# Patient Record
Sex: FEMALE | Race: WHITE | Employment: FULL TIME
[De-identification: names, ages, dates, MRNs, and addresses within clinical notes are randomized per-mention and may not be internally consistent; named-entity substitution may affect disease eponyms.]

---

## 2022-05-24 ENCOUNTER — TELEMEDICINE (OUTPATIENT)
Dept: INTERNAL MEDICINE CLINIC | Facility: CLINIC | Age: 32
End: 2022-05-24
Payer: COMMERCIAL

## 2022-05-24 ENCOUNTER — E-VISIT (OUTPATIENT)
Dept: INTERNAL MEDICINE CLINIC | Facility: CLINIC | Age: 32
End: 2022-05-24
Payer: COMMERCIAL

## 2022-05-24 DIAGNOSIS — L02.92 CARBUNCLE AND FURUNCLE: Primary | ICD-10-CM

## 2022-05-24 DIAGNOSIS — L02.93 CARBUNCLE AND FURUNCLE: Primary | ICD-10-CM

## 2022-05-24 PROCEDURE — 99214 OFFICE O/P EST MOD 30 MIN: CPT | Performed by: INTERNAL MEDICINE

## 2022-05-24 RX ORDER — SULFAMETHOXAZOLE AND TRIMETHOPRIM 800; 160 MG/1; MG/1
1 TABLET ORAL 2 TIMES DAILY
Qty: 20 TABLET | Refills: 0 | Status: SHIPPED | OUTPATIENT
Start: 2022-05-24 | End: 2022-06-03

## 2022-05-24 RX ORDER — TRIAMCINOLONE ACETONIDE 5 MG/G
CREAM TOPICAL
Qty: 1 EACH | Refills: 0 | Status: SHIPPED | OUTPATIENT
Start: 2022-05-24 | End: 2022-05-31

## 2022-05-24 ASSESSMENT — ENCOUNTER SYMPTOMS
DIARRHEA: 0
NAIL CHANGES: 0
VOMITING: 0
SHORTNESS OF BREATH: 0

## 2022-05-24 NOTE — PROGRESS NOTES
FOLLOW UP VISIT    Subjective:    Merle Roots (: 1990) is a 28 y.o., female,   Chief Complaint   Patient presents with    Rash     under right arm  10 days        HPI:  28year old in for rash . She noticed it under her armpit a week ago. Has some raised lesion . Of note she does shave. Rash  This is a new problem. The current episode started in the past 7 days. The problem has been gradually worsening since onset. The affected locations include the left axilla. The rash is characterized by blistering, dryness, itchiness and redness. Pertinent negatives include no anorexia, diarrhea, facial edema, fever, joint pain, nail changes, shortness of breath or vomiting. The following portions of the patient's history were reviewed and updated as appropriate:      Past Medical History:   Diagnosis Date    PCOS (polycystic ovarian syndrome)        Past Surgical History:   Procedure Laterality Date    GYN  2018            Family History   Problem Relation Age of Onset    Stroke Paternal Grandfather     Diabetes Paternal Grandfather        Social History     Socioeconomic History    Marital status:      Spouse name: Not on file    Number of children: Not on file    Years of education: Not on file    Highest education level: Not on file   Occupational History    Not on file   Tobacco Use    Smoking status: Never Smoker    Smokeless tobacco: Never Used   Substance and Sexual Activity    Alcohol use: No    Drug use: Yes     Types: Marijuana Leah Ing)    Sexual activity: Not on file   Other Topics Concern    Not on file   Social History Narrative    Not on file     Social Determinants of Health     Financial Resource Strain:     Difficulty of Paying Living Expenses: Not on file   Food Insecurity:     Worried About [address] in the Last Year: Not on file    [address] in the Last Year: Not on file   Transportation Needs:     Lack of Transportation (Medical):  Not on file    Lack of Transportation (Non-Medical): Not on file   Physical Activity:     Days of Exercise per Week: Not on file    Minutes of Exercise per Session: Not on file   Stress:     Feeling of Stress : Not on file   Social Connections:     Frequency of Communication with Friends and Family: Not on file    Frequency of Social Gatherings with Friends and Family: Not on file    Attends Religion Services: Not on file    Active Member of [address] [place] [address] or Organizations: Not on file    Attends Club or Organization Meetings: Not on file    Marital Status: Not on file   Intimate Partner Violence:     Fear of Current or Ex-Partner: Not on file    Emotionally Abused: Not on file    Physically Abused: Not on file    Sexually Abused: Not on file   Housing Stability:     Unable to Pay for Housing in the Last Year: Not on file    Number of [place] in the Last Year: Not on file    Unstable Housing in the Last Year: Not on file       Current Outpatient Medications   Medication Sig Dispense Refill    sulfamethoxazole-trimethoprim (BACTRIM DS;SEPTRA DS) 800-160 MG per tablet Take 1 tablet by mouth 2 times daily for 10 days 20 tablet 0    triamcinolone (ARISTOCORT) 0.5 % cream Apply topically 3 times daily. 1 each 0    FOLIC ACID PO Take by mouth daily      MAGNESIUM PO Take by mouth daily      ergocalciferol (ERGOCALCIFEROL) 1.25 MG (99960 UT) capsule Take 50,000 Units by mouth every 7 days      hydrocortisone 2.5 % cream Place rectally 4 times daily (Patient not taking: Reported on 5/24/2022)      hydrocortisone (ANUSOL-HC) 25 MG suppository Place 25 mg rectally every 12 hours      medroxyPROGESTERone (PROVERA) 10 MG tablet Take 10 mg by mouth daily      metFORMIN (GLUCOPHAGE) 500 MG tablet Take 500 mg by mouth 3 times daily (with meals)       No current facility-administered medications for this visit.        Allergies as of 05/24/2022    (No Known Allergies)       Review of Systems   Constitutional: Negative for fever. Respiratory: Negative for shortness of breath. Gastrointestinal: Negative for anorexia, diarrhea and vomiting. Musculoskeletal: Negative for joint pain. Skin: Positive for rash. Negative for nail changes. Objective: There were no vitals taken for this visit. Physical Exam  Vitals and nursing note reviewed. Constitutional:       Appearance: Normal appearance. Skin:     Comments: Rash under left axilla 2-3 carbuncles appreciated - red raised lesion   Neurological:      Mental Status: She is alert. No results found for this visit on 05/24/22. Assessent & Plan     Diagnosis Orders   1. Carbuncle and furuncle  sulfamethoxazole-trimethoprim (BACTRIM DS;SEPTRA DS) 800-160 MG per tablet    triamcinolone (ARISTOCORT) 0.5 % cream        Very nice patient in for follow up. She has developed a rash on her armpit     Suspect carbuncle   Stop shaving don't use any creams or meds other then one rx. If not improved go to Dermatology  Patient is not pregnant or thinking about getting pregnant  If not improved rtc   Med risks and AE advised       Shahab Schmid  was evaluated through a synchronous (real-time) audio-video encounter, and/or her healthcare decision maker, is aware that it is a billable service, which includes applicable co-pays, with coverage as determined by her insurance carrier. She provided verbal consent to proceed and patient identification was verified. This visit was conducted pursuant to the emergency declaration under the [facility], [address] [place] [address] authority and the [employer] General Act. A caregiver was present when appropriate. Ability to conduct physical exam was limited. The patient was located at home in a state where the provider was licensed to provide care.      The patient and/or patient representative voiced understanding and agreement with the current diagnoses, recommendations, and possible side effects. No follow-up provider specified.       Jaguar Cheung MD

## 2023-02-02 ENCOUNTER — HOSPITAL ENCOUNTER (EMERGENCY)
Age: 33
Discharge: HOME | End: 2023-02-02
Attending: EMERGENCY MEDICINE
Payer: COMMERCIAL

## 2023-02-02 ENCOUNTER — APPOINTMENT (OUTPATIENT)
Dept: ULTRASOUND IMAGING | Age: 33
End: 2023-02-02
Payer: COMMERCIAL

## 2023-02-02 VITALS
SYSTOLIC BLOOD PRESSURE: 144 MMHG | HEART RATE: 80 BPM | TEMPERATURE: 98.7 F | DIASTOLIC BLOOD PRESSURE: 90 MMHG | BODY MASS INDEX: 43.19 KG/M2 | OXYGEN SATURATION: 99 % | HEIGHT: 68 IN | RESPIRATION RATE: 18 BRPM | WEIGHT: 285 LBS

## 2023-02-02 DIAGNOSIS — R10.11 ABDOMINAL PAIN, RIGHT UPPER QUADRANT: Primary | ICD-10-CM

## 2023-02-02 LAB
ALBUMIN SERPL-MCNC: 4.1 G/DL (ref 3.5–5)
ALBUMIN/GLOB SERPL: 1.3 (ref 0.4–1.6)
ALP SERPL-CCNC: 71 U/L (ref 45–117)
ALT SERPL-CCNC: 31 U/L (ref 13–61)
ANION GAP SERPL CALC-SCNC: 9 MMOL/L (ref 2–11)
APPEARANCE UR: CLEAR
AST SERPL-CCNC: 27 U/L (ref 15–37)
BASOPHILS # BLD: 0.1 K/UL (ref 0–0.2)
BASOPHILS NFR BLD: 1 % (ref 0–2)
BILIRUB SERPL-MCNC: 0.2 MG/DL (ref 0.2–1.1)
BILIRUB UR QL: NEGATIVE
BUN SERPL-MCNC: 12 MG/DL (ref 7–18)
CALCIUM SERPL-MCNC: 9.4 MG/DL (ref 8.3–10.4)
CHLORIDE SERPL-SCNC: 106 MMOL/L (ref 98–107)
CO2 SERPL-SCNC: 25 MMOL/L (ref 21–32)
COLOR UR: YELLOW
CREAT SERPL-MCNC: 0.43 MG/DL (ref 0.6–1)
DIFFERENTIAL METHOD BLD: ABNORMAL
EOSINOPHIL # BLD: 0.2 K/UL (ref 0–0.8)
EOSINOPHIL NFR BLD: 3 % (ref 0.5–7.8)
ERYTHROCYTE [DISTWIDTH] IN BLOOD BY AUTOMATED COUNT: 12.3 % (ref 11.9–14.6)
GLOBULIN SER CALC-MCNC: 3.2 G/DL (ref 2.8–4.5)
GLUCOSE SERPL-MCNC: 99 MG/DL (ref 65–100)
GLUCOSE UR STRIP.AUTO-MCNC: NEGATIVE MG/DL
HCG UR QL: NEGATIVE
HCT VFR BLD AUTO: 41 % (ref 35.8–46.3)
HGB BLD-MCNC: 14.1 G/DL (ref 11.7–15.4)
HGB UR QL STRIP: NEGATIVE
IMM GRANULOCYTES # BLD AUTO: 0 K/UL (ref 0–0.5)
IMM GRANULOCYTES NFR BLD AUTO: 0 % (ref 0–5)
KETONES UR QL STRIP.AUTO: NEGATIVE MG/DL
LEUKOCYTE ESTERASE UR QL STRIP.AUTO: NEGATIVE
LIPASE SERPL-CCNC: 23 U/L (ref 13–60)
LYMPHOCYTES # BLD: 2 K/UL (ref 0.5–4.6)
LYMPHOCYTES NFR BLD: 29 % (ref 13–44)
MCH RBC QN AUTO: 29.8 PG (ref 26.1–32.9)
MCHC RBC AUTO-ENTMCNC: 34.4 G/DL (ref 31.4–35)
MCV RBC AUTO: 86.7 FL (ref 82–102)
MONOCYTES # BLD: 0.5 K/UL (ref 0.1–1.3)
MONOCYTES NFR BLD: 7 % (ref 4–12)
NEUTS SEG # BLD: 4.2 K/UL (ref 1.7–8.2)
NEUTS SEG NFR BLD: 60 % (ref 43–78)
NITRITE UR QL STRIP.AUTO: NEGATIVE
NRBC # BLD: 0 K/UL (ref 0–0.2)
PH UR STRIP: 7 (ref 5–9)
PLATELET # BLD AUTO: 218 K/UL (ref 150–450)
PMV BLD AUTO: 8.5 FL (ref 9.4–12.3)
POTASSIUM SERPL-SCNC: 3.9 MMOL/L (ref 3.5–5.1)
PROT SERPL-MCNC: 7.3 G/DL (ref 6.4–8.2)
PROT UR STRIP-MCNC: NEGATIVE MG/DL
RBC # BLD AUTO: 4.73 M/UL (ref 4.05–5.2)
SODIUM SERPL-SCNC: 140 MMOL/L (ref 133–143)
SP GR UR REFRACTOMETRY: 1.01 (ref 1–1.02)
UROBILINOGEN UR QL STRIP.AUTO: 0.2 EU/DL (ref 0.2–1)
WBC # BLD AUTO: 7 K/UL (ref 4.3–11.1)

## 2023-02-02 PROCEDURE — 85025 COMPLETE CBC W/AUTO DIFF WBC: CPT

## 2023-02-02 PROCEDURE — 6370000000 HC RX 637 (ALT 250 FOR IP): Performed by: PHYSICIAN ASSISTANT

## 2023-02-02 PROCEDURE — 83690 ASSAY OF LIPASE: CPT

## 2023-02-02 PROCEDURE — 81025 URINE PREGNANCY TEST: CPT

## 2023-02-02 PROCEDURE — 80053 COMPREHEN METABOLIC PANEL: CPT

## 2023-02-02 PROCEDURE — 76705 ECHO EXAM OF ABDOMEN: CPT

## 2023-02-02 PROCEDURE — 99284 EMERGENCY DEPT VISIT MOD MDM: CPT

## 2023-02-02 PROCEDURE — 81003 URINALYSIS AUTO W/O SCOPE: CPT

## 2023-02-02 RX ORDER — FAMOTIDINE 20 MG/1
20 TABLET, FILM COATED ORAL 2 TIMES DAILY
Qty: 60 TABLET | Refills: 0 | Status: SHIPPED | OUTPATIENT
Start: 2023-02-02

## 2023-02-02 RX ORDER — MAGNESIUM HYDROXIDE/ALUMINUM HYDROXICE/SIMETHICONE 120; 1200; 1200 MG/30ML; MG/30ML; MG/30ML
30 SUSPENSION ORAL
Status: COMPLETED | OUTPATIENT
Start: 2023-02-02 | End: 2023-02-02

## 2023-02-02 RX ORDER — LIDOCAINE HYDROCHLORIDE 20 MG/ML
15 SOLUTION OROPHARYNGEAL
Status: COMPLETED | OUTPATIENT
Start: 2023-02-02 | End: 2023-02-02

## 2023-02-02 RX ADMIN — ALUMINUM HYDROXIDE, MAGNESIUM HYDROXIDE, AND SIMETHICONE 30 ML: 200; 200; 20 SUSPENSION ORAL at 14:40

## 2023-02-02 RX ADMIN — LIDOCAINE HYDROCHLORIDE 15 ML: 20 SOLUTION ORAL; TOPICAL at 14:40

## 2023-02-02 ASSESSMENT — ENCOUNTER SYMPTOMS
SHORTNESS OF BREATH: 0
BACK PAIN: 0
CONSTIPATION: 0
VOMITING: 0
COUGH: 0
DIARRHEA: 0
SORE THROAT: 0
NAUSEA: 1
ABDOMINAL PAIN: 1

## 2023-02-02 ASSESSMENT — PAIN SCALES - GENERAL: PAINLEVEL_OUTOF10: 2

## 2023-02-02 ASSESSMENT — PAIN - FUNCTIONAL ASSESSMENT: PAIN_FUNCTIONAL_ASSESSMENT: 0-10

## 2023-02-02 NOTE — FLOWSHEET NOTE
I have reviewed discharge instructions with the patient. The patient verbalized understanding. Patient left ED via Discharge Method: ambulatory to Home. Opportunity for questions and clarification provided. Patient given 1 scripts. To continue your aftercare when you leave the hospital, you may receive an automated call from our care team to check in on how you are doing. This is a free service and part of our promise to provide the best care and service to meet your aftercare needs.  If you have questions, or wish to unsubscribe from this service please call [phone]. Thank you for Choosing our [facility] Emergency Department.

## 2023-02-02 NOTE — DISCHARGE INSTRUCTIONS
Your work-up today was unremarkable. We do not always find the cause of pain however I can confidently say there is no life-threatening or limb threatening etiology occurring today. I am going to try you on some simple medications. Follow-up with your primary care physician. Return here if you develop worsening or worrisome symptoms.

## 2023-02-02 NOTE — ED PROVIDER NOTES
Emergency Department Provider Note                   PCP:                Kendrick Hwang MD               Age: 35 y.o. Sex: female       ICD-10-CM    1. Abdominal pain, right upper quadrant  R10.11           DISPOSITION Decision To Discharge 02/02/2023 03:17:28 PM        Reynold Chu is a 35 y.o. female who presents to the Emergency Department with chief complaint of    Chief Complaint   Patient presents with    Abdominal Pain      This is a 35-year-old obese female with no stated past medical history who presents to this department with 2-day history of right upper quadrant and epigastric pain. She states this pain somewhat radiates to her right shoulder and is described as a \"aching with minimal associated pain. It is worsened by eating. Endorses some increased belching as well. She reports a \"bloating\" feeling after eating foods. She has taken Tums with minimal improvement of her symptoms. She has not vomited. She has no change in bowel or bladder habits. She denies any chance of pregnancy given that she has an IUD placed. Pain is constant and gradually worsening. She denies any fever or chills. The history is provided by the patient. No  was used. Review of Systems   Constitutional:  Negative for chills and fever. HENT:  Negative for congestion and sore throat. Respiratory:  Negative for cough and shortness of breath. Cardiovascular:  Negative for chest pain and palpitations. Gastrointestinal:  Positive for abdominal pain and nausea. Negative for constipation, diarrhea and vomiting. Genitourinary:  Negative for dysuria and vaginal discharge. Musculoskeletal:  Negative for back pain. Skin:  Negative for wound. Psychiatric/Behavioral:  Negative for agitation. All other systems reviewed and are negative.     Past Medical History:   Diagnosis Date    PCOS (polycystic ovarian syndrome)         Past Surgical History:   Procedure Laterality Date    GYN 2018             Family History   Problem Relation Age of Onset    Stroke Paternal Grandfather     Diabetes Paternal Grandfather         Social History     Socioeconomic History    Marital status:      Spouse name: None    Number of children: None    Years of education: None    Highest education level: None   Tobacco Use    Smoking status: Never    Smokeless tobacco: Never   Substance and Sexual Activity    Alcohol use: No    Drug use: Yes     Types: Marijuana Rosalina Hikes)         Patient has no known allergies. Previous Medications    No medications on file        Vitals signs and nursing note reviewed. Patient Vitals for the past 4 hrs:   Temp Pulse Resp BP SpO2   23 1356 -- -- -- (!) 145/95 95 %   23 1349 -- -- -- (!) 150/80 --   23 1345 98.6 °F (37 °C) 75 19 -- 99 %          Physical Exam  Vitals and nursing note reviewed. Constitutional:       General: She is not in acute distress. Appearance: Normal appearance. She is not ill-appearing, toxic-appearing or diaphoretic. HENT:      Head: Normocephalic and atraumatic. Nose: Nose normal.      Mouth/Throat:      Mouth: Mucous membranes are moist.   Eyes:      Pupils: Pupils are equal, round, and reactive to light. Cardiovascular:      Rate and Rhythm: Normal rate. Pulmonary:      Effort: Pulmonary effort is normal. No respiratory distress. Abdominal:      General: Abdomen is flat. Bowel sounds are normal.      Palpations: Abdomen is soft. Tenderness: There is abdominal tenderness in the right upper quadrant and epigastric area. There is no right CVA tenderness, left CVA tenderness or guarding. Musculoskeletal:         General: Normal range of motion. Cervical back: Normal range of motion. No rigidity. Skin:     General: Skin is warm. Neurological:      General: No focal deficit present. Mental Status: She is alert.    Psychiatric:         Mood and Affect: Mood normal. Procedures    Medical Decision Making  40-year-old female who presented here with epigastric and right upper quadrant pain. She stated no past medical history. Did obtain abdominal work-up. Lab work is all well within normal limits without any electrolyte derangement, elevation of white counts, or an elevated lipase. Her urine is clean without signs of infection. Pregnancy is negative. I did obtain a right upper quadrant ultrasound which demonstrates no acute sonographic abnormality. Does show an echogenic liver compatible with steatosis and/or fibrosis. My independent review, I would agree with the radiologist impression. Patient is stable for discharge home, to not have concern for emergent or life-threatening etiology. I will start her on some H2 blockers. She will need to follow-up with her primary care physician. She understands she may return here if she develops worsening or worrisome symptoms. Problems Addressed:  Abdominal pain, right upper quadrant: acute illness or injury    Amount and/or Complexity of Data Reviewed  Labs: ordered. Details: nml  Radiology: ordered and independent interpretation performed. Details: agree with rads    Risk  OTC drugs. Prescription drug management. Complexity of Problem: 1 undiagnosed new problem with uncertain prognosis. (4)  I have conducted an independent ordering and review of Labs. I have conducted an independent ordering and review of Ultrasound. Considerations: Shared decision making was utilized in the care of this patient. We discussed care recommended by provider that patient declined (tests, disposition, etc). We discussed care requested by patient that the provider declined (includes tests, admit, dc, antibiotics, etc). Patient was discharged risks and benefits of hospitalization were discussed.          Orders Placed This Encounter   Procedures    US ABDOMEN LIMITED    CBC with Auto Differential    CMP    Lipase Urinalysis    Urine Preg (Lab)        Medications   aluminum & magnesium hydroxide-simethicone (MAALOX) 200-200-20 MG/5ML suspension 30 mL (30 mLs Oral Given 2/2/23 1440)   lidocaine viscous hcl (XYLOCAINE) 2 % solution 15 mL (15 mLs Mouth/Throat Given 2/2/23 1440)       New Prescriptions    FAMOTIDINE (PEPCID) 20 MG TABLET    Take 1 tablet by mouth 2 times daily        Results for orders placed or performed during the hospital encounter of 02/02/23   US ABDOMEN LIMITED    Narrative    EXAM: RIGHT UPPER QUADRANT  ULTRASOUND    INDICATION: gb    COMPARISON: None    TECHNIQUE: Transabdominal imaging of the right upper quadrant was performed. FINDINGS:   Pancreas: Normal, where visualized. Liver: Diffusely echogenic. No focal lesion evident within this slight  limitation. Gallbladder: Normal gallbladder without stones or wall thickening. Negative  Ward sign. Biliary: No intrahepatic bile duct dilatation. CBD diameter = 3 mm. Right kidney: 13.1 cm. No mass or hydronephrosis. Abdominal aorta and IVC: Normal, where visualized. Impression    1. No acute sonographic abnormality. 2.  Echogenic liver compatible with steatosis and/or fibrosis.      CBC with Auto Differential   Result Value Ref Range    WBC 7.0 4.3 - 11.1 K/uL    RBC 4.73 4.05 - 5.20 M/uL    Hemoglobin 14.1 11.7 - 15.4 g/dL    Hematocrit 41.0 35.8 - 46.3 %    MCV 86.7 82.0 - 102.0 FL    MCH 29.8 26.1 - 32.9 PG    MCHC 34.4 31.4 - 35.0 g/dL    RDW 12.3 11.9 - 14.6 %    Platelets 768 142 - 566 K/uL    MPV 8.5 (L) 9.4 - 12.3 FL    nRBC 0.00 0.0 - 0.2 K/uL    Differential Type AUTOMATED      Seg Neutrophils 60 43 - 78 %    Lymphocytes 29 13 - 44 %    Monocytes 7 4.0 - 12.0 %    Eosinophils % 3 0.5 - 7.8 %    Basophils 1 0.0 - 2.0 %    Immature Granulocytes 0 0.0 - 5.0 %    Segs Absolute 4.2 1.7 - 8.2 K/UL    Absolute Lymph # 2.0 0.5 - 4.6 K/UL    Absolute Mono # 0.5 0.1 - 1.3 K/UL    Absolute Eos # 0.2 0.0 - 0.8 K/UL    Basophils Absolute 0.1 0.0 - 0.2 K/UL    Absolute Immature Granulocyte 0.0 0.0 - 0.5 K/UL   CMP   Result Value Ref Range    Sodium 140 133 - 143 mmol/L    Potassium 3.9 3.5 - 5.1 mmol/L    Chloride 106 98 - 107 mmol/L    CO2 25 21 - 32 mmol/L    Anion Gap 9 2 - 11 mmol/L    Glucose 99 65 - 100 mg/dL    BUN 12 7.0 - 18.0 MG/DL    Creatinine 0.43 (L) 0.6 - 1.0 MG/DL    Est, Glom Filt Rate >60 >60 ml/min/1.73m2    Calcium 9.4 8.3 - 10.4 MG/DL    Total Bilirubin 0.2 0.2 - 1.1 MG/DL    ALT 31 13.0 - 61.0 U/L    AST 27 15 - 37 U/L    Alk Phosphatase 71 45.0 - 117.0 U/L    Total Protein 7.3 6.4 - 8.2 g/dL    Albumin 4.1 3.5 - 5.0 g/dL    Globulin 3.2 2.8 - 4.5 g/dL    Albumin/Globulin Ratio 1.3 0.4 - 1.6     Lipase   Result Value Ref Range    Lipase 23 13 - 60 U/L   Urinalysis   Result Value Ref Range    Color, UA YELLOW      Appearance CLEAR      Specific Gravity, UA 1.015 1.001 - 1.023      pH, Urine 7.0 5.0 - 9.0      Protein, UA Negative NEG mg/dL    Glucose, UA Negative mg/dL    Ketones, Urine Negative NEG mg/dL    Bilirubin Urine Negative NEG      Blood, Urine Negative NEG      Urobilinogen, Urine 0.2 0.2 - 1.0 EU/dL    Nitrite, Urine Negative NEG      Leukocyte Esterase, Urine Negative NEG     Urine Preg (Lab)   Result Value Ref Range    HCG(Urine) Pregnancy Test Negative          US ABDOMEN LIMITED   Final Result   1. No acute sonographic abnormality. 2.  Echogenic liver compatible with steatosis and/or fibrosis. Voice dictation software was used during the making of this note. This software is not perfect and grammatical and other typographical errors may be present. This note has not been completely proofread for errors.      [place], [place]  02/02/23 0916

## 2023-02-02 NOTE — ED TRIAGE NOTES
Reports frequent indigestion, reports increased burping x2 days. Reports upper abd soreness, abd bloating. No relief with tums. Reports nausea.

## 2023-03-02 ENCOUNTER — TELEMEDICINE (OUTPATIENT)
Dept: INTERNAL MEDICINE CLINIC | Facility: CLINIC | Age: 33
End: 2023-03-02
Payer: COMMERCIAL

## 2023-03-02 DIAGNOSIS — K21.9 GASTROESOPHAGEAL REFLUX DISEASE WITHOUT ESOPHAGITIS: Primary | ICD-10-CM

## 2023-03-02 PROCEDURE — 99214 OFFICE O/P EST MOD 30 MIN: CPT | Performed by: INTERNAL MEDICINE

## 2023-03-02 RX ORDER — FAMOTIDINE 20 MG/1
20 TABLET, FILM COATED ORAL 2 TIMES DAILY
Qty: 180 TABLET | Refills: 1 | Status: SHIPPED | OUTPATIENT
Start: 2023-03-02

## 2023-03-02 ASSESSMENT — ENCOUNTER SYMPTOMS
ABDOMINAL PAIN: 0
STRIDOR: 0
BELCHING: 0
HEARTBURN: 1
SORE THROAT: 0

## 2023-03-02 NOTE — PROGRESS NOTES
FOLLOW UP VISIT    Subjective:    Rd Romero (: 1990) is a 35 y.o., female,   Chief Complaint   Patient presents with    Follow-up       HPI:  35year old in for follow up. She went to ER last month for GERD and had US showed normal gallbladder. She had blood work and test and US and had labs all normal.  She was diagnosed with GERD and was given Pepcid and stated she felt better. Gastroesophageal Reflux  She complains of heartburn. She reports no abdominal pain, no belching, no chest pain, no sore throat, no stridor or no tooth decay.        The following portions of the patient's history were reviewed and updated as appropriate:      Past Medical History:   Diagnosis Date    PCOS (polycystic ovarian syndrome)        Past Surgical History:   Procedure Laterality Date    GYN  2018            Family History   Problem Relation Age of Onset    Stroke Paternal Grandfather     Diabetes Paternal Grandfather        Social History     Socioeconomic History    Marital status:      Spouse name: Not on file    Number of children: Not on file    Years of education: Not on file    Highest education level: Not on file   Occupational History    Not on file   Tobacco Use    Smoking status: Never    Smokeless tobacco: Never   Substance and Sexual Activity    Alcohol use: No    Drug use: Yes     Types: Marijuana Sydna Silk)    Sexual activity: Not on file   Other Topics Concern    Not on file   Social History Narrative    Not on file     Social Determinants of Health     Financial Resource Strain: Not on file   Food Insecurity: Not on file   Transportation Needs: Not on file   Physical Activity: Not on file   Stress: Not on file   Social Connections: Not on file   Intimate Partner Violence: Not on file   Housing Stability: Not on file       Current Outpatient Medications   Medication Sig Dispense Refill    famotidine (PEPCID) 20 MG tablet Take 1 tablet by mouth 2 times daily 60 tablet 0     No current facility-administered medications for this visit. Allergies as of 03/02/2023    (No Known Allergies)       Review of Systems   Constitutional: Negative. HENT: Negative. Negative for sore throat. Cardiovascular:  Negative for chest pain. Gastrointestinal:  Positive for heartburn. Negative for abdominal pain. Genitourinary: Negative. Objective: There were no vitals taken for this visit. Physical Exam    No results found for this visit on 03/02/23. Assessent & Plan    35year old in for GERD   Rx Pepcid  Is not pregnant  Reviewed ED notes  Needs to be set up for a cpe   Brenda Velarde was evaluated through a synchronous (real-time) audio-video encounter, and/or her healthcare decision maker, is aware that it is a billable service, which includes applicable co-pays, with coverage as determined by her insurance carrier. She provided verbal consent to proceed and patient identification was verified. This visit was conducted pursuant to the emergency declaration under the [address] [place] [address] authority and the [employer] General Act. A caregiver was present when appropriate. Ability to conduct physical exam was limited. The patient was located at home in a state where the provider was licensed to provide care. --Gera Eden MD on 3/2/2023 at 1:52 PM    An electronic signature was used to authenticate this note. The patient and/or patient representative voiced understanding and agreement with the current diagnoses, recommendations, and possible side effects. No follow-up provider specified.       Gera Eden MD